# Patient Record
Sex: FEMALE
[De-identification: names, ages, dates, MRNs, and addresses within clinical notes are randomized per-mention and may not be internally consistent; named-entity substitution may affect disease eponyms.]

---

## 2017-10-27 ENCOUNTER — HOSPITAL ENCOUNTER (OUTPATIENT)
Dept: HOSPITAL 14 - H.OPSURG | Age: 47
Discharge: HOME | End: 2017-10-27
Attending: SPECIALIST
Payer: COMMERCIAL

## 2017-10-27 VITALS — HEART RATE: 56 BPM | SYSTOLIC BLOOD PRESSURE: 101 MMHG | DIASTOLIC BLOOD PRESSURE: 57 MMHG | TEMPERATURE: 97.4 F

## 2017-10-27 VITALS — RESPIRATION RATE: 18 BRPM | OXYGEN SATURATION: 100 %

## 2017-10-27 VITALS — BODY MASS INDEX: 27.9 KG/M2

## 2017-10-27 DIAGNOSIS — N88.8: Primary | ICD-10-CM

## 2017-10-27 PROCEDURE — 57522 CONIZATION OF CERVIX: CPT

## 2017-10-27 PROCEDURE — 88305 TISSUE EXAM BY PATHOLOGIST: CPT

## 2017-10-30 NOTE — OP
PROCEDURE DATE:  10/27/2017



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

cervix.



PROCEDURE:  Excision of the lesion and also loop electrosurgical excision

procedure of the cervix.



SURGEON:  Ulises Bobo MD



TYPE OF ANESTHESIA:  General.



ANESTHESIA ADMINISTERED BY:  Dr. Cadet.



DESCRIPTION OF PROCEDURE:  With the patient in the dorsal lithotomy

position under general anesthesia, the patient was prepped and draped in

the usual sterile manner.  The bladder was emptied with straight cath after

which the patient was examined.  Anterior lip of the cervix was grasped

with single-tooth tenaculum and the LEEP procedure began.  The LEEP was

used and the incision was included in the specimen for LEEP procedure. 

Hemostasis was maintained after this.  The blood loss was minimal and the

patient tolerated the procedure well and was in satisfactory condition on

her way to recovery room.





__________________________________________

Ulises Bobo MD





DD:  10/30/2017 9:08:15

DT:  10/30/2017 9:42:08

Job # [id number]

## 2018-07-27 ENCOUNTER — HOSPITAL ENCOUNTER (EMERGENCY)
Dept: HOSPITAL 14 - H.ER | Age: 48
Discharge: HOME | End: 2018-07-27
Payer: COMMERCIAL

## 2018-07-27 VITALS
SYSTOLIC BLOOD PRESSURE: 128 MMHG | OXYGEN SATURATION: 100 % | TEMPERATURE: 98.2 F | DIASTOLIC BLOOD PRESSURE: 79 MMHG | HEART RATE: 60 BPM

## 2018-07-27 VITALS — RESPIRATION RATE: 18 BRPM

## 2018-07-27 VITALS — BODY MASS INDEX: 27.9 KG/M2

## 2018-07-27 DIAGNOSIS — L03.114: Primary | ICD-10-CM

## 2018-07-27 LAB
BASOPHILS # BLD AUTO: 0.1 K/UL (ref 0–0.2)
BASOPHILS NFR BLD: 1.1 % (ref 0–2)
BUN SERPL-MCNC: 15 MG/DL (ref 7–17)
CALCIUM SERPL-MCNC: 9.4 MG/DL (ref 8.4–10.2)
EOSINOPHIL # BLD AUTO: 0.1 K/UL (ref 0–0.7)
EOSINOPHIL NFR BLD: 1.6 % (ref 0–4)
ERYTHROCYTE [DISTWIDTH] IN BLOOD BY AUTOMATED COUNT: 12.8 % (ref 11.5–14.5)
GFR NON-AFRICAN AMERICAN: > 60
HGB BLD-MCNC: 13.1 G/DL (ref 12–16)
LYMPHOCYTES # BLD AUTO: 2.5 K/UL (ref 1–4.3)
LYMPHOCYTES NFR BLD AUTO: 32.1 % (ref 20–40)
MCH RBC QN AUTO: 33.2 PG (ref 27–31)
MCHC RBC AUTO-ENTMCNC: 33.4 G/DL (ref 33–37)
MCV RBC AUTO: 99.4 FL (ref 81–99)
MONOCYTES # BLD: 0.6 K/UL (ref 0–0.8)
MONOCYTES NFR BLD: 7.4 % (ref 0–10)
NEUTROPHILS # BLD: 4.5 K/UL (ref 1.8–7)
NEUTROPHILS NFR BLD AUTO: 57.8 % (ref 50–75)
NRBC BLD AUTO-RTO: 0 % (ref 0–0)
PLATELET # BLD: 228 K/UL (ref 130–400)
PMV BLD AUTO: 8.1 FL (ref 7.2–11.7)
RBC # BLD AUTO: 3.94 MIL/UL (ref 3.8–5.2)
WBC # BLD AUTO: 7.7 K/UL (ref 4.8–10.8)

## 2018-07-27 PROCEDURE — 96360 HYDRATION IV INFUSION INIT: CPT

## 2018-07-27 PROCEDURE — 85025 COMPLETE CBC W/AUTO DIFF WBC: CPT

## 2018-07-27 PROCEDURE — 99284 EMERGENCY DEPT VISIT MOD MDM: CPT

## 2018-07-27 PROCEDURE — 87040 BLOOD CULTURE FOR BACTERIA: CPT

## 2018-07-27 PROCEDURE — 81025 URINE PREGNANCY TEST: CPT

## 2018-07-27 PROCEDURE — 80048 BASIC METABOLIC PNL TOTAL CA: CPT

## 2018-07-27 NOTE — ED PDOC
HPI: Skin/Bite Injury


Time Seen by Provider: 07/27/18 01:23


Chief Complaint (Nursing): Upper Extremity Problem/Injury


Chief Complaint (Provider): skin rash


History Per: Patient


History/Exam Limitations: no limitations


Onset/Duration Of Symptoms: Persistent (x2)


Current Symptoms Are (Timing): Still Present


Additional Complaint(s): 


47 year old  female arrives to emergency department with a complaint of 

skin rash to left elbow associated with swelling, redness and tingling sensation

, onset of 2 days. She denies any fever or chills. Patient has been applying 

ice to affected area with minimal relief and reports being unable to sleep due 

to sensitivity of rash, thus, prompting ED visit.





PMD: Dr. Madan Andersen





Past Medical History


Reviewed: Historical Data, Nursing Documentation, Vital Signs


Vital Signs: 


 Last Vital Signs











Temp  98.3 F   07/27/18 01:19


 


Pulse  61   07/27/18 01:19


 


Resp  18   07/27/18 01:19


 


BP  144/86   07/27/18 01:19


 


Pulse Ox  98   07/27/18 01:59














- Medical History


PMH: No Chronic Diseases


   Denies: Chronic Kidney Disease





- Surgical History


Surgical History: 


   Denies: No Surg Hx


Other surgeries: Loop electrosurgical excision procedure (LEEP)





- Family History


Family History: States: Unknown Family Hx





- Social History


Current smoker - smoking cessation education provided: No


Alcohol: None


Drugs: Denies





- Immunization History


Hx Tetanus Toxoid Vaccination: No


Hx Influenza Vaccination: No


Hx Pneumococcal Vaccination: No





- Home Medications


Home Medications: 


 Ambulatory Orders











 Medication  Instructions  Recorded


 


Ibuprofen [Motrin Tab] 800 mg PO Q8 10/27/17


 


cycloSPORINE [Restasis] 1 each OP BID 10/27/17


 


Amoxicillin/Clavulanate [Augmentin 1 tab PO BID #20 tab 07/27/18





875 MG-125 MG]  














- Allergies


Allergies/Adverse Reactions: 


 Allergies











Allergy/AdvReac Type Severity Reaction Status Date / Time


 


No Known Allergies Allergy   Verified 03/12/16 18:26














Review of Systems


ROS Statement: Except As Marked, All Systems Reviewed And Found Negative


Constitutional: Negative for: Fever, Chills


Skin: Positive for: Rash (left elbow with swelling, redness and tingling 

sensation)





Physical Exam





- Reviewed


Nursing Documentation Reviewed: Yes


Vital Signs Reviewed: Yes





- Physical Exam


Appears: Positive for: Non-toxic, No Acute Distress


Skin: Positive for: Rash (8cm surrounding erythema, induration and warmth to 

left medial aspect of elbow with 2 central blisters. (-) fluctance)





- Laboratory Results


Result Diagrams: 


 07/27/18 02:18





 07/27/18 02:18





- ECG


O2 Sat by Pulse Oximetry: 98 (RA)


Pulse Ox Interpretation: Normal





Medical Decision Making


Medical Decision Making: 


Initial Impression: 46 y/o  female with left upper extremity cellulitic 

lesions





Initial Plan:


* BMP


* Urine pregnancy


* Urine dipstick


* CBC


* Toradol 30mg IV


* Zosyn 100ml IV


* Blood culture


----------





Time: 0312


--Labs reviewed: no significant abnormality. Upon provider reevaluation, 

patient is medically stable and requires no further treatment in the ED at this 

time. Patient will be discharged home with Rx for Augmentin 875mg and advised 

to apply warm and cold compress to affected area. Counseling was provided and 

all questions were answered regarding diagnosis and need for follow up with Dr. Andersen. There is agreement to discharge plan. Return if symptoms persist or 

worsen.





Clinical Impression: Cellulitis





--------------------------------------------------------------------------------

-----------------


Scribe Attestation:


Documented by Sofya Martino, acting as a scribe for Brett Dawkins MD.





Provider Scribe Attestation:


All medical record entries made by the Scribe were at my direction and 

personally dictated by me. I have reviewed the chart and agree that the record 

accurately reflects my personal performance of the history, physical exam, 

medical decision making, and the department course for this patient. I have 

also personally directed, reviewed, and agree with the discharge instructions 

and disposition.





Disposition





- Clinical Impression


Clinical Impression: 


 Cellulitis








- Patient ED Disposition


Is Patient to be Admitted: No


Counseled Patient/Family Regarding: Studies Performed, Diagnosis, Need For 

Followup





- Disposition


Referrals: 


Madan Andersen MD [Primary Care Provider] - 


Disposition: Routine/Home


Disposition Time: 03:12


Condition: STABLE


Prescriptions: 


Amoxicillin/Clavulanate [Augmentin 875 MG-125 MG] 1 tab PO BID #20 tab


Instructions:  Cellulitis (Skin Infection), Adult (DC)


Forms:  [facility] (English)